# Patient Record
Sex: FEMALE | Race: WHITE
[De-identification: names, ages, dates, MRNs, and addresses within clinical notes are randomized per-mention and may not be internally consistent; named-entity substitution may affect disease eponyms.]

---

## 2017-08-08 ENCOUNTER — HOSPITAL ENCOUNTER (OUTPATIENT)
Dept: HOSPITAL 62 - RAD | Age: 57
End: 2017-08-08
Attending: PHYSICIAN ASSISTANT
Payer: COMMERCIAL

## 2017-08-08 DIAGNOSIS — M50.30: Primary | ICD-10-CM

## 2017-08-08 PROCEDURE — 72141 MRI NECK SPINE W/O DYE: CPT

## 2017-08-08 NOTE — RADIOLOGY REPORT (SQ)
EXAM DESCRIPTION:  MRI CERVICAL SPINE WITHOUT



COMPLETED DATE/TIME:  8/8/2017 7:09 pm



REASON FOR STUDY:  OTHER CERVICAL DISC DEGENERATION,UNSPECIFIED CERVICAL REGION M50.30  OTHER CERVICA
L DISC DEGENERATION, UNSP CERVICAL REGIO



COMPARISON:  None.



TECHNIQUE:  Sagittal and Axial imaging includes T1, T2, STIR and gradient echo sequences.



LIMITATIONS:  None.



FINDINGS:  ALIGNMENT: Normal.

VERTEBRAE: Intact.

BONE MARROW: Normal. No marrow replacement or reactive changes.

DISCS: Normal. No significant abnormal signal or loss of height.

HARDWARE: None in the spine.

CORD AND BASE OF BRAIN: Normal in size and signal intensity.

SOFT TISSUES: No soft tissue masses.

C1-C2: No significant spinal stenosis.

C2-C3: No significant spinal stenosis or exit foraminal stenosis.

C3-C4: No significant spinal stenosis or exit foraminal stenosis.

C4-C5: No significant spinal stenosis or exit foraminal stenosis.

C5-C6: No significant spinal stenosis or exit foraminal stenosis.

C6-C7: There is a small central protrusion at C6-C7.  No high-grade central stenosis or nerve root im
pingement.

C7-T1: No significant spinal stenosis or exit foraminal stenosis.

UPPER THORACIC: Incompletely imaged. No significant spinal stenosis or exit foraminal stenosis.

OTHER: No other significant finding.



IMPRESSION:  There is a small central protrusion at C6-C7.  No high-grade central stenosis or nerve r
oot impingement.



TECHNICAL DOCUMENTATION:  JOB ID:  [id number]

 2011 [employer]- All Rights Reserved

## 2018-05-03 ENCOUNTER — HOSPITAL ENCOUNTER (EMERGENCY)
Dept: HOSPITAL 62 - ER | Age: 58
Discharge: HOME | End: 2018-05-03
Payer: COMMERCIAL

## 2018-05-03 VITALS — DIASTOLIC BLOOD PRESSURE: 70 MMHG | SYSTOLIC BLOOD PRESSURE: 143 MMHG

## 2018-05-03 DIAGNOSIS — L02.411: Primary | ICD-10-CM

## 2018-05-03 DIAGNOSIS — I10: ICD-10-CM

## 2018-05-03 PROCEDURE — 10060 I&D ABSCESS SIMPLE/SINGLE: CPT

## 2018-05-03 PROCEDURE — 0H9BXZZ DRAINAGE OF RIGHT UPPER ARM SKIN, EXTERNAL APPROACH: ICD-10-PCS | Performed by: NURSE PRACTITIONER

## 2018-05-03 PROCEDURE — S0119 ONDANSETRON 4 MG: HCPCS

## 2018-05-03 PROCEDURE — 99283 EMERGENCY DEPT VISIT LOW MDM: CPT

## 2018-05-03 NOTE — ER DOCUMENT REPORT
HPI





- HPI


Patient complains to provider of: Right axilla abscess


Onset: Other - Several days


Onset/Duration: Gradual


Pain Level: 4


Context: 





57-year-old female with a history of abscesses under both of the axilla and the 

groin area has one for several days in the right axilla that has not drained on 

its own.  Culture back in 2012 in the emergency department showed that 

cephalexin should cover the bacteria that she was growing.  No history of MRSA 

since.  No fever or chills.


Associated Symptoms: None


Exacerbated by: Movement


Relieved by: Denies


Similar symptoms previously: No


Recently seen / treated by doctor: No





- ROS


ROS below otherwise negative: Yes


Systems Reviewed and Negative: Yes All other systems reviewed and negative





- REPRODUCTIVE


Reproductive: DENIES: Pregnant:





Past Medical History





- General


Information source: Patient





- Social History


Smoking Status: Unknown if Ever Smoked


Frequency of alcohol use: None


Drug Abuse: None


Lives with: Family


Family History: Reviewed & Not Pertinent





- Past Medical History


Cardiac Medical History: Reports: Hx Hypertension


Endocrine Medical History: Reports: Hx Hypothyroidism


Skin Medical History: Reports Hx Cellulitis


Past Surgical History: Reports: Hx Oral Surgery, Hx Orthopedic Surgery.  Denies

: Hx Hysterectomy





- Immunizations


Immunizations up to date: Yes


Hx Diphtheria, Pertussis, Tetanus Vaccination: Yes


Hx Pneumococcal Vaccination: 10/10/11





Vertical Provider Document





- CONSTITUTIONAL


Agree With Documented VS: Yes


Exam Limitations: No Limitations





- INFECTION CONTROL


TRAVEL OUTSIDE OF THE [place] IN LAST 30 DAYS: No





- HEENT


HEENT: Normocephalic





- NECK


Neck: Supple





- MUSCULOSKELETAL/EXTREMETIES


Musculoskeletal/Extremeties: MAEW, Tender - Abscess under right axilla





- NEURO


Level of Consciousness: Awake





- DERM


Integumentary: Abscess - Right axilla





Course





- Vital Signs


Vital signs: 


 











Temp Pulse Resp BP Pulse Ox


 


 98.8 F   66   18   160/68 H  98 


 


 05/03/18 16:09  05/03/18 16:09  05/03/18 16:09  05/03/18 16:09  05/03/18 16:09














Procedures





- Incision and Drainage


  ** Right Arm


Time completed: 17:32


Type: Simple


Anesthetic type: 1% Lidocaine


mL's of anesthetic: 3


Blade size: 11


I&D procedure: Betadine prep applied, Sterile dressing applied, Other - Corner 

of 4 x 4 packing


Incision Method: Incision made by scalpel


Amount/type of drainage: Large puss, blood





Discharge





- Discharge


Clinical Impression: 


 Right axilla abscess I&D





Condition: Good


Disposition: HOME, SELF-CARE


Instructions:  Abscess ([facility]), Cephalexin ([facility]), Ibuprofen (General) ([facility]), Post 

Incision and Drainage


Additional Instructions: 


Warm compress


Leave the dressing on for 2 days then removed


Wash vigorously with antibacterial soap and a washcloth in the shower letting 

the water run into the area


Return to the emergency room there is any worsening of the symptoms


Wound check in 2 days.


Prescriptions: 


Ibuprofen [Motrin 800 mg Tablet] 800 mg PO Q8HP PRN #30 tablet


 PRN Reason: 


Cephalexin Monohydrate [Keflex 500 mg Capsule] 500 mg PO QID #28 capsule


Forms:  Return to Work

## 2018-07-09 ENCOUNTER — HOSPITAL ENCOUNTER (OUTPATIENT)
Dept: HOSPITAL 62 - RAD | Age: 58
End: 2018-07-09
Attending: PHYSICIAN ASSISTANT
Payer: COMMERCIAL

## 2018-07-09 DIAGNOSIS — M23.91: Primary | ICD-10-CM

## 2018-07-09 DIAGNOSIS — M22.41: ICD-10-CM

## 2018-07-10 NOTE — RADIOLOGY REPORT (SQ)
EXAM DESCRIPTION:  MRI RT LOWER JOINT WITHOUT



COMPLETED DATE/TIME:  7/9/2018 5:20 pm



REASON FOR STUDY:  UNSPECIFIED INTERNAL DERANGEMENT OF RIGHT KNEE M23.91  UNSPECIFIED INTERNAL DERANG
EMENT OF RIGHT KNEE



COMPARISON:  None.



TECHNIQUE:  Rightknee images acquired and stored on PACS.  Multiplanar images include fat sensitive s
equences as T1, water sensitive sequences as FST2 or STIR, cartilage sensitive sequences as FSPD, and
 gradient echo sequences.



LIMITATIONS:  None.



FINDINGS:  JOINT AND BURSAE: Moderate suprapatellar knee joint effusion.  No Baker's cyst.

BONE CORTEX AND MARROW: An osteochondral fracture with 1 cm loose fragment is present along the nonwe
ightbearing surface, posterior aspect lateral femoral condyles.  This is best shown on sagittal image
s 7-9 and coronal image 21.

There is edema in the medial edge non weight-bearing surface medial femoral condyle on coronal image 
17.

ACL: Intact. No degeneration or ganglion cyst.

PCL: Intact.

MCL: Intact. No periligamentous edema or fluid.

LCL: Intact. No periligamentous edema or fluid.

MEDIAL MENISCUS: Horizontal tear undersurface mid body medial meniscus best shown on coronal image 17
.

LATERAL MENISCUS: Horizontal tear mid body lateral meniscus best shown on coronal images 17-19.

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
 image 16 and 17.

LATERAL COMPARTMENT: Weight-bearing surface lateral femoral condyles cartilage intact.  Osteochondral
 defect along the posterior non weight-bearing surface lateral femoral condyle as above.

PATELLA: Moderate chondromalacia medial patellar facet

EXTENSOR MECHANISM: Intact. Quadriceps and patella tendons normal.

SOFT TISSUES: Adjacent muscles and subcutaneous tissues normal.  Normal flow void in popliteal artery
 and vein.

OTHER: No other significant finding.



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

Small tears in the medial and lateral menisci

Moderate size osteochondral fragment, posterior nonweightbearing surface lateral femoral condyle

Knee joint effusion, medial patellar facet chondromalacia



TECHNICAL DOCUMENTATION:  JOB ID:  [id number]

 [employer]- All Rights Reserved



Reading location - IP/workstation name: [email or web address]